# Patient Record
Sex: FEMALE | Race: OTHER
[De-identification: names, ages, dates, MRNs, and addresses within clinical notes are randomized per-mention and may not be internally consistent; named-entity substitution may affect disease eponyms.]

---

## 2017-01-27 ENCOUNTER — HOSPITAL ENCOUNTER (EMERGENCY)
Dept: HOSPITAL 54 - ER | Age: 62
Discharge: HOME | End: 2017-01-27
Payer: COMMERCIAL

## 2017-01-27 VITALS — HEIGHT: 61 IN | WEIGHT: 150 LBS | BODY MASS INDEX: 28.32 KG/M2

## 2017-01-27 VITALS — SYSTOLIC BLOOD PRESSURE: 152 MMHG | DIASTOLIC BLOOD PRESSURE: 72 MMHG
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
## 2017-01-27 DIAGNOSIS — I10: ICD-10-CM
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
## 2017-01-27 DIAGNOSIS — M79.1: Primary | ICD-10-CM

## 2017-01-27 DIAGNOSIS — Z90.710: ICD-10-CM
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
## 2017-01-27 PROCEDURE — 99282 EMERGENCY DEPT VISIT SF MDM: CPT

## 2017-01-27 PROCEDURE — A4606 OXYGEN PROBE USED W OXIMETER: HCPCS

## 2017-01-27 PROCEDURE — Z7610: HCPCS

## 2017-05-27 ENCOUNTER — HOSPITAL ENCOUNTER (EMERGENCY)
Dept: HOSPITAL 10 - E/R | Age: 62
LOS: 1 days | Discharge: HOME | End: 2017-05-28
Payer: COMMERCIAL

## 2017-05-27 VITALS — HEIGHT: 55 IN | BODY MASS INDEX: 36.22 KG/M2 | WEIGHT: 156.53 LBS

## 2017-05-27 DIAGNOSIS — J02.9: Primary | ICD-10-CM

## 2017-05-27 DIAGNOSIS — R05: ICD-10-CM

## 2017-05-27 DIAGNOSIS — I10: ICD-10-CM

## 2017-05-27 LAB
ADD SCAN DIFF: NO
ALBUMIN SERPL-MCNC: 4.5 G/DL (ref 3.3–4.9)
ALBUMIN/GLOB SERPL: 1.28 {RATIO}
ALP SERPL-CCNC: 107 IU/L (ref 42–121)
ALT SERPL-CCNC: 61 IU/L (ref 13–69)
ANION GAP SERPL CALC-SCNC: 10 MMOL/L (ref 8–16)
APTT BLD: 26.9 SEC (ref 25–35)
AST SERPL-CCNC: 45 IU/L (ref 15–46)
BASOPHILS # BLD AUTO: 0 10^3/UL (ref 0–0.1)
BASOPHILS NFR BLD: 0.4 % (ref 0–2)
BILIRUB DIRECT SERPL-MCNC: 0 MG/DL (ref 0–0.2)
BILIRUB SERPL-MCNC: 0.1 MG/DL (ref 0.2–1.3)
BUN SERPL-MCNC: 22 MG/DL (ref 7–20)
CALCIUM SERPL-MCNC: 9.7 MG/DL (ref 8.4–10.2)
CHLORIDE SERPL-SCNC: 112 MMOL/L (ref 97–110)
CO2 SERPL-SCNC: 26 MMOL/L (ref 21–31)
CREAT SERPL-MCNC: 0.93 MG/DL (ref 0.44–1)
D DIMER PPP FEU-MCNC: 461.05 NG/ML (ref ?–460)
EOSINOPHIL # BLD: 0.5 10^3/UL (ref 0–0.5)
EOSINOPHIL NFR BLD: 5.7 % (ref 0–7)
ERYTHROCYTE [DISTWIDTH] IN BLOOD BY AUTOMATED COUNT: 13.4 % (ref 11.5–14.5)
GLOBULIN SER-MCNC: 3.5 G/DL (ref 1.3–3.2)
GLUCOSE SERPL-MCNC: 123 MG/DL (ref 70–220)
HCT VFR BLD CALC: 44.1 % (ref 37–47)
HGB BLD-MCNC: 14.4 G/DL (ref 12–16)
INR PPP: 0.86
LYMPHOCYTES # BLD AUTO: 2.3 10^3/UL (ref 0.8–2.9)
LYMPHOCYTES NFR BLD AUTO: 28.2 % (ref 15–51)
MCH RBC QN AUTO: 30.2 PG (ref 29–33)
MCHC RBC AUTO-ENTMCNC: 32.7 G/DL (ref 32–37)
MCV RBC AUTO: 92.5 FL (ref 82–101)
MONOCYTES # BLD: 0.6 10^3/UL (ref 0.3–0.9)
MONOCYTES NFR BLD: 7.9 % (ref 0–11)
NEUTROPHILS # BLD: 4.6 10^3/UL (ref 1.6–7.5)
NEUTROPHILS NFR BLD AUTO: 57.7 % (ref 39–77)
NRBC # BLD MANUAL: 0 10^3/UL (ref 0–0)
NRBC BLD QL: 0 /100WBC (ref 0–0)
PLATELET # BLD: 257 10^3/UL (ref 140–415)
PMV BLD AUTO: 11.4 FL (ref 7.4–10.4)
POTASSIUM SERPL-SCNC: 3.9 MMOL/L (ref 3.5–5.1)
PROT SERPL-MCNC: 8 G/DL (ref 6.1–8.1)
PROTHROMBIN TIME: 11.7 SEC (ref 12.2–14.2)
PT RATIO: 0.9
RBC # BLD AUTO: 4.77 10^6/UL (ref 4.2–5.4)
SODIUM SERPL-SCNC: 144 MMOL/L (ref 135–144)
TROPONIN I SERPL-MCNC: < 0.012 NG/ML (ref 0–0.12)
WBC # BLD AUTO: 8 10^3/UL (ref 4.8–10.8)

## 2017-05-27 PROCEDURE — 85610 PROTHROMBIN TIME: CPT

## 2017-05-27 PROCEDURE — 85025 COMPLETE CBC W/AUTO DIFF WBC: CPT

## 2017-05-27 PROCEDURE — 85378 FIBRIN DEGRADE SEMIQUANT: CPT

## 2017-05-27 PROCEDURE — 93005 ELECTROCARDIOGRAM TRACING: CPT

## 2017-05-27 PROCEDURE — 71010: CPT

## 2017-05-27 PROCEDURE — 84484 ASSAY OF TROPONIN QUANT: CPT

## 2017-05-27 PROCEDURE — 80053 COMPREHEN METABOLIC PANEL: CPT

## 2017-05-27 PROCEDURE — 36415 COLL VENOUS BLD VENIPUNCTURE: CPT

## 2017-05-27 PROCEDURE — 94664 DEMO&/EVAL PT USE INHALER: CPT

## 2017-05-27 PROCEDURE — 85730 THROMBOPLASTIN TIME PARTIAL: CPT

## 2017-05-27 NOTE — RADRPT
PROCEDURE:   XR Chest. 

 

CLINICAL INDICATION:   Chest pain 

 

TECHNIQUE:   Anterior chest x-ray. 

 

COMPARISON:   None. 

 

FINDINGS:

Rounded densities overlying the lower lung zones likely represent breast implants.

The lungs are otherwise clear.

No pleural effusion identified.

There is no evidence of pneumothorax.

The cardiomediastinal silhouette is unremarkable.  

The soft tissues are normal.

Osseous structures are unremarkable.

 

IMPRESSION:      

 

1.  No acute disease is seen in the chest.  

 

RPTAT: HLDM

_____________________________________________ 

.Chaz Bernardo MD, MD           Date    Time 

Electronically viewed and signed by .Chaz Bernardo MD, MD on 05/27/2017 21:56 

 

D:  05/27/2017 21:56  T:  05/27/2017 21:56

.M/

## 2017-05-27 NOTE — ERA
ER Documentation


Chief Complaint


Date/Time


DATE: 17 


TIME: 21:10


Chief Complaint


CWP AND RIB PAIN FOR THE PAST FEW DAYS WITH COUGH AND CONGESTION, MILD SOB





HPI


The patient is a 61-year-old female, presenting to the ER because of 

intermittent, productive cough for the last 5 days.  She denies fever, chills, 

neck pain, dyspnea.  She complains of chest wall pain with coughing.  She 

denies abdominal pain, vomiting, dysuria, diarrhea.  She does not smoke, drinks 

socially





Past medical history: Hypertension


Past surgical history: None





ROS


All systems reviewed and are negative except as per history of present illness.





Medications


Home Meds


Active Scripts


Guaifenesin-Codeine Phosphate* (Robitussin* AC) 5 Ml Syrup, 10 ML PO Q6H Y for 

COUGH, #120 ML


   Prov:LORA WILLS MD         17


Albuterol Sulfate* (Ventolin HFA*) 18 Gm Hfa.aer.ad, 2 PUFF INHALATION Q4H, #1 

INHALER


   Prov:LORA WILLS MD         17


Azithromycin* (Zithromax*) 250 Mg Tablet, 250 MG PO .ZPACK AS DIRECTED, #6 TAB


   TAKE 500 MG (2 TABS) THE FIRST DAY THEN 250 MG (1 TAB) DAYS 2-5


   Prov:LORA WILLS MD         17





Allergies


Allergies:  


Coded Allergies:  


     No Known Allergy (Unverified , 17)





Physical Exam


Vitals





Vital Signs








  Date Time  Temp Pulse Resp B/P Pulse Ox O2 Delivery O2 Flow Rate FiO2


 


17 00:00  81 18 114/82 97 Room Air  


 


17 23:00  84 13 112/66  Room Air  


 


17 22:00  82 17 104/79 97 Room Air  


 


17 21:40  87 19  97   21


 


17 20:58 98.5 91 20 149/79 99   








Physical Exam


 Const:      No acute distress.


 Head:        Atraumatic.


 Eyes:       Normal Conjunctiva.


 ENT:         Normal External Ears, Nose and Mouth.


 Neck:        Full range of motion.  No meningismus.


 Resp:         Minimal bilateral expiratory wheezes


 Cardio:       Regular rate and rhythm, no murmurs.


 Abd:         Soft,  non distended, normal bowel sounds, non tender.


 Skin:         No petechiae or rashes.


 Back:        No midline or flank tenderness.


 Ext:          No cyanosis, or edema.


 Neur:        Awake and alert. No focal deficit


 Psych:        Normal Mood and Affect.


Result Diagram:  


17





Results 24 hrs








 Laboratory Tests








Test


  17


21:23


 


White Blood Count 8.010^3/ul 


 


Red Blood Count 4.7710^6/ul 


 


Hemoglobin 14.4g/dl 


 


Hematocrit 44.1% 


 


Mean Corpuscular Volume 92.5fl 


 


Mean Corpuscular Hemoglobin 30.2pg 


 


Mean Corpuscular Hemoglobin


Concent 32.7g/dl 


 


 


Red Cell Distribution Width 13.4% 


 


Platelet Count 21922^3/UL 


 


Mean Platelet Volume 11.4fl 


 


Neutrophils % 57.7% 


 


Lymphocytes % 28.2% 


 


Monocytes % 7.9% 


 


Eosinophils % 5.7% 


 


Basophils % 0.4% 


 


Nucleated Red Blood Cells % 0.0/100WBC 


 


Neutrophils # 4.610^3/ul 


 


Lymphocytes # 2.310^3/ul 


 


Monocytes # 0.610^3/ul 


 


Eosinophils # 0.510^3/ul 


 


Basophils # 0.010^3/ul 


 


Nucleated Red Blood Cells # 0.010^3/ul 


 


Prothrombin Time 11.7Sec 


 


Prothrombin Time Ratio 0.9 


 


INR International Normalized


Ratio 0.86 


 


 


Activated Partial


Thromboplast Time 26.9Sec 


 


 


D-Dimer 461.05ng/ml 


 


D-Dimer Comment  


 


Sodium Level 144mmol/L 


 


Potassium Level 3.9mmol/L 


 


Chloride Level 112mmol/L 


 


Carbon Dioxide Level 26mmol/L 


 


Anion Gap 10 


 


Blood Urea Nitrogen 22mg/dl 


 


Creatinine 0.93mg/dl 


 


Glucose Level 123mg/dl 


 


Calcium Level 9.7mg/dl 


 


Total Bilirubin 0.1mg/dl 


 


Direct Bilirubin 0.00mg/dl 


 


Indirect Bilirubin 0.1mg/dl 


 


Aspartate Amino Transf


(AST/SGOT) 45IU/L 


 


 


Alanine Aminotransferase


(ALT/SGPT) 61IU/L 


 


 


Alkaline Phosphatase 107IU/L 


 


Troponin I < 0.012ng/ml 


 


Total Protein 8.0g/dl 


 


Albumin 4.5g/dl 


 


Globulin 3.50g/dl 


 


Albumin/Globulin Ratio 1.28 








 Current Medications








 Medications


  (Trade)  Dose


 Ordered  Sig/Malaika


 Route


 PRN Reason  Start Time


 Stop Time Status Last Admin


Dose Admin


 


 Ipratropium


 Bromide


  (Atrovent 0.02%


  (Neb))  0.5 mg  ONCE  STAT


 INH


   17 21:19


 5/27/17 21:44 DC 17 21:50


 


 


 Levalbuterol


  (Xopenex Neb)  1.25 mg  ONCE  ONCE


 HHN


   17 21:30


 17 21:44 DC 17 21:50


 














Procedures/MDM





 [place] [id number]


 Rad[facility] Line: [phone]





 DIAGNOSTIC IMAGING REPORT





 Patient: MECHELLE COLIN   : 1955   Age: 61  Sex: F                

        


 MR #:    [id number]   Acct #:   [id number]    DOS: 17


 Ordering MD: LORA WILLS MD   Location:  E/R   Room/Bed:                  

                          


 








PROCEDURE:   XR Chest. 


 


CLINICAL INDICATION:   Chest pain 


 


TECHNIQUE:   Anterior chest x-ray. 


 


COMPARISON:   None. 


 


FINDINGS:


Rounded densities overlying the lower lung zones likely represent breast 

implants.


The lungs are otherwise clear.


No pleural effusion identified.


There is no evidence of pneumothorax.


The cardiomediastinal silhouette is unremarkable.  


The soft tissues are normal.


Osseous structures are unremarkable.


 


IMPRESSION:      


 


1.  No acute disease is seen in the chest.  


 


RPTAT: HLDM


_____________________________________________ 


.Chaz Bernardo MD, MD           Date    Time 


Electronically viewed and signed by .Chaz Bernardo MD, MD on 2017 21:

56 


 


D:  2017 21:56  T:  2017 21:56


.M/





CC: LORA WILLS MD





EKG:                           Read by emergency physician


Rate/Rhythm:          Normal Sinus Rhythm   92 beats/min


QRS, ST, T-waves:    No ST elevation, no T inversion, nonspecific ST abnormality


Impression:             Abnormal   EKG





MEDICAL MAKING DECISION: The patient is a 61-year-old female, presenting to the 

ER because of acute bronchitis.  She was treated with Xopenex 1.25 mg and 

Atrovent 0.5 mg with good response.





The differential diagnoses considered include but are not limited to asthma, 

COPD, pneumonia, pulmonary embolus, pleural effusion, congestive heart failure.





Departure


Diagnosis:  


 Primary Impression:  


 Acute bronchitis


Condition:  Good


Comments


She was discharged with Zithromax, Robitussin AC, albuterol MDI





I discussed the findings with the patient. I advised the patient to follow-up 

with the primary physician in about 1-2 days, sooner if needed and return if 

any concern.











LORA WILLS MD May 27, 2017 21:10

## 2017-05-28 VITALS — HEART RATE: 81 BPM | SYSTOLIC BLOOD PRESSURE: 114 MMHG | RESPIRATION RATE: 18 BRPM | DIASTOLIC BLOOD PRESSURE: 82 MMHG

## 2019-08-24 ENCOUNTER — HOSPITAL ENCOUNTER (EMERGENCY)
Dept: HOSPITAL 91 - E/R | Age: 64
Discharge: HOME | End: 2019-08-24
Payer: COMMERCIAL

## 2019-08-24 ENCOUNTER — HOSPITAL ENCOUNTER (EMERGENCY)
Dept: HOSPITAL 10 - E/R | Age: 64
Discharge: HOME | End: 2019-08-24
Payer: COMMERCIAL

## 2019-08-24 VITALS — RESPIRATION RATE: 19 BRPM | DIASTOLIC BLOOD PRESSURE: 65 MMHG | HEART RATE: 81 BPM | SYSTOLIC BLOOD PRESSURE: 120 MMHG

## 2019-08-24 VITALS
HEIGHT: 65 IN | HEIGHT: 65 IN | BODY MASS INDEX: 27.55 KG/M2 | WEIGHT: 165.35 LBS | BODY MASS INDEX: 27.55 KG/M2 | WEIGHT: 165.35 LBS

## 2019-08-24 DIAGNOSIS — I10: ICD-10-CM
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
## 2019-08-24 DIAGNOSIS — S40.021A: ICD-10-CM

## 2019-08-24 DIAGNOSIS — S13.4XXA: Primary | ICD-10-CM

## 2019-08-24 PROCEDURE — 99283 EMERGENCY DEPT VISIT LOW MDM: CPT

## 2019-08-24 PROCEDURE — 73060 X-RAY EXAM OF HUMERUS: CPT

## 2019-08-24 RX ADMIN — IBUPROFEN 1 MG: 800 TABLET, FILM COATED ORAL at 21:03

## 2024-08-19 ENCOUNTER — HOSPITAL ENCOUNTER (EMERGENCY)
Dept: HOSPITAL 54 - ER | Age: 69
Discharge: HOME | End: 2024-08-19
Payer: COMMERCIAL

## 2024-08-19 VITALS — OXYGEN SATURATION: 98 % | DIASTOLIC BLOOD PRESSURE: 65 MMHG | SYSTOLIC BLOOD PRESSURE: 125 MMHG

## 2024-08-19 VITALS — WEIGHT: 150 LBS | BODY MASS INDEX: 28.32 KG/M2 | HEIGHT: 61 IN

## 2024-08-19 VITALS — TEMPERATURE: 98.1 F

## 2024-08-19 DIAGNOSIS — Z90.710: ICD-10-CM

## 2024-08-19 DIAGNOSIS — E11.9: ICD-10-CM

## 2024-08-19 DIAGNOSIS — I10: ICD-10-CM

## 2024-08-19 DIAGNOSIS — J06.9: Primary | ICD-10-CM

## 2024-08-19 LAB
ALBUMIN SERPL BCP-MCNC: 3.8 G/DL (ref 3.4–5)
ALP SERPL-CCNC: 86 U/L (ref 46–116)
ALT SERPL W P-5'-P-CCNC: 23 U/L (ref 12–78)
AST SERPL W P-5'-P-CCNC: 17 U/L (ref 15–37)
BASOPHILS # BLD AUTO: 0 K/UL (ref 0–0.2)
BASOPHILS NFR BLD AUTO: 0.7 % (ref 0–2)
BILIRUB DIRECT SERPL-MCNC: 0.1 MG/DL (ref 0–0.2)
BILIRUB SERPL-MCNC: 0.2 MG/DL (ref 0.2–1)
BUN SERPL-MCNC: 42 MG/DL (ref 7–18)
CALCIUM SERPL-MCNC: 9 MG/DL (ref 8.5–10.1)
CHLORIDE SERPL-SCNC: 107 MMOL/L (ref 98–107)
CO2 SERPL-SCNC: 30 MMOL/L (ref 21–32)
CREAT SERPL-MCNC: 1.1 MG/DL (ref 0.6–1.3)
EOSINOPHIL # BLD AUTO: 0.2 K/UL (ref 0–0.7)
EOSINOPHIL NFR BLD AUTO: 2.9 % (ref 0–6)
ERYTHROCYTE [DISTWIDTH] IN BLOOD BY AUTOMATED COUNT: 14.8 % (ref 11.5–15)
GLUCOSE SERPL-MCNC: 108 MG/DL (ref 74–106)
HCT VFR BLD AUTO: 43 % (ref 33–45)
HGB BLD-MCNC: 14 G/DL (ref 11.5–14.8)
LYMPHOCYTES NFR BLD AUTO: 1.7 K/UL (ref 0.8–4.8)
LYMPHOCYTES NFR BLD AUTO: 30.1 % (ref 20–44)
MCH RBC QN AUTO: 30 PG (ref 26–33)
MCHC RBC AUTO-ENTMCNC: 32 G/DL (ref 31–36)
MCV RBC AUTO: 92 FL (ref 82–100)
MONOCYTES NFR BLD AUTO: 0.6 K/UL (ref 0.1–1.3)
MONOCYTES NFR BLD AUTO: 9.6 % (ref 2–12)
NEUTROPHILS # BLD AUTO: 3.3 K/UL (ref 1.8–8.9)
NEUTROPHILS NFR BLD AUTO: 56.7 % (ref 43–81)
NT-PROBNP SERPL-MCNC: 44 PG/ML (ref 0–125)
PLATELET # BLD AUTO: 244 K/UL (ref 150–450)
POTASSIUM SERPL-SCNC: 4.3 MMOL/L (ref 3.5–5.1)
PROT SERPL-MCNC: 7.4 G/DL (ref 6.4–8.2)
RBC # BLD AUTO: 4.75 MIL/UL (ref 4–5.2)
SODIUM SERPL-SCNC: 139 MMOL/L (ref 136–145)
WBC NRBC COR # BLD AUTO: 5.8 K/UL (ref 4.3–11)

## 2024-08-19 PROCEDURE — 93005 ELECTROCARDIOGRAM TRACING: CPT

## 2024-08-19 PROCEDURE — 83880 ASSAY OF NATRIURETIC PEPTIDE: CPT

## 2024-08-19 PROCEDURE — 71045 X-RAY EXAM CHEST 1 VIEW: CPT

## 2024-08-19 PROCEDURE — 96375 TX/PRO/DX INJ NEW DRUG ADDON: CPT

## 2024-08-19 PROCEDURE — 96361 HYDRATE IV INFUSION ADD-ON: CPT

## 2024-08-19 PROCEDURE — 96374 THER/PROPH/DIAG INJ IV PUSH: CPT

## 2024-08-19 PROCEDURE — 87426 SARSCOV CORONAVIRUS AG IA: CPT

## 2024-08-19 PROCEDURE — 80076 HEPATIC FUNCTION PANEL: CPT

## 2024-08-19 PROCEDURE — 87804 INFLUENZA ASSAY W/OPTIC: CPT

## 2024-08-19 PROCEDURE — 99285 EMERGENCY DEPT VISIT HI MDM: CPT

## 2024-08-19 PROCEDURE — 36415 COLL VENOUS BLD VENIPUNCTURE: CPT

## 2024-08-19 PROCEDURE — 80048 BASIC METABOLIC PNL TOTAL CA: CPT

## 2024-08-19 PROCEDURE — 85025 COMPLETE CBC W/AUTO DIFF WBC: CPT

## 2024-08-19 PROCEDURE — 84484 ASSAY OF TROPONIN QUANT: CPT

## 2024-08-19 RX ADMIN — DEXTROSE MONOHYDRATE ONE MG: 50 INJECTION, SOLUTION INTRAVENOUS at 19:31

## 2024-08-19 RX ADMIN — SODIUM CHLORIDE ONE ML: 9 INJECTION, SOLUTION INTRAVENOUS at 19:30

## 2024-08-19 RX ADMIN — KETOROLAC TROMETHAMINE ONE MG: 15 INJECTION, SOLUTION INTRAMUSCULAR; INTRAVENOUS at 19:32

## 2024-08-19 NOTE — NUR
BIB SON, TESTED POSITIVE FOR COVID ON 8/7 HAS TESTING NEGATIVE SINCE BUT DALLIN 
HAS A SORE THROAT, HEADACHE, EAR PAIN,SHORTNESS OF BREATH AND PHLEGM.